# Patient Record
Sex: FEMALE
[De-identification: names, ages, dates, MRNs, and addresses within clinical notes are randomized per-mention and may not be internally consistent; named-entity substitution may affect disease eponyms.]

---

## 2018-09-25 PROBLEM — Z00.00 ENCOUNTER FOR PREVENTIVE HEALTH EXAMINATION: Status: ACTIVE | Noted: 2018-09-25

## 2018-11-20 ENCOUNTER — RECORD ABSTRACTING (OUTPATIENT)
Age: 83
End: 2018-11-20

## 2018-11-20 DIAGNOSIS — D35.00 BENIGN NEOPLASM OF UNSPECIFIED ADRENAL GLAND: ICD-10-CM

## 2018-11-20 DIAGNOSIS — Z86.39 PERSONAL HISTORY OF OTHER ENDOCRINE, NUTRITIONAL AND METABOLIC DISEASE: ICD-10-CM

## 2018-11-20 DIAGNOSIS — Z82.49 FAMILY HISTORY OF ISCHEMIC HEART DISEASE AND OTHER DISEASES OF THE CIRCULATORY SYSTEM: ICD-10-CM

## 2018-11-20 DIAGNOSIS — Z87.39 PERSONAL HISTORY OF OTHER DISEASES OF THE MUSCULOSKELETAL SYSTEM AND CONNECTIVE TISSUE: ICD-10-CM

## 2018-11-20 DIAGNOSIS — Z86.79 PERSONAL HISTORY OF OTHER DISEASES OF THE CIRCULATORY SYSTEM: ICD-10-CM

## 2018-11-20 RX ORDER — METOPROLOL SUCCINATE 50 MG/1
50 TABLET, EXTENDED RELEASE ORAL
Refills: 0 | Status: ACTIVE | COMMUNITY

## 2018-11-20 RX ORDER — PANTOPRAZOLE 40 MG/1
40 TABLET, DELAYED RELEASE ORAL DAILY
Refills: 0 | Status: ACTIVE | COMMUNITY

## 2018-11-21 ENCOUNTER — APPOINTMENT (OUTPATIENT)
Dept: ENDOCRINOLOGY | Facility: CLINIC | Age: 83
End: 2018-11-21

## 2019-01-15 ENCOUNTER — APPOINTMENT (OUTPATIENT)
Dept: ENDOCRINOLOGY | Facility: CLINIC | Age: 84
End: 2019-01-15
Payer: MEDICARE

## 2019-01-15 VITALS
WEIGHT: 157 LBS | HEART RATE: 71 BPM | OXYGEN SATURATION: 98 % | SYSTOLIC BLOOD PRESSURE: 130 MMHG | HEIGHT: 66 IN | BODY MASS INDEX: 25.23 KG/M2 | DIASTOLIC BLOOD PRESSURE: 70 MMHG

## 2019-01-15 LAB — GLUCOSE BLDC GLUCOMTR-MCNC: 325

## 2019-01-15 PROCEDURE — 82962 GLUCOSE BLOOD TEST: CPT

## 2019-01-15 PROCEDURE — 99214 OFFICE O/P EST MOD 30 MIN: CPT | Mod: 25

## 2019-01-15 RX ORDER — NIFEDIPINE 90 MG/1
90 TABLET, FILM COATED, EXTENDED RELEASE ORAL DAILY
Refills: 0 | Status: ACTIVE | COMMUNITY

## 2019-01-15 RX ORDER — CLOPIDOGREL 75 MG/1
75 TABLET, FILM COATED ORAL DAILY
Refills: 0 | Status: ACTIVE | COMMUNITY

## 2019-01-15 RX ORDER — ATORVASTATIN CALCIUM 20 MG/1
20 TABLET, FILM COATED ORAL DAILY
Refills: 0 | Status: ACTIVE | COMMUNITY

## 2019-01-15 RX ORDER — MONTELUKAST SODIUM 10 MG/1
10 TABLET, FILM COATED ORAL DAILY
Refills: 0 | Status: ACTIVE | COMMUNITY

## 2019-01-15 RX ORDER — METHYLPREDNISOLONE 4 MG/1
4 TABLET ORAL
Refills: 0 | Status: DISCONTINUED | COMMUNITY
End: 2019-01-15

## 2019-01-15 RX ORDER — VALSARTAN 320 MG/1
320 TABLET, COATED ORAL DAILY
Refills: 0 | Status: ACTIVE | COMMUNITY

## 2019-01-15 NOTE — ASSESSMENT
[FreeTextEntry1] : 84 year old female with T2DM, hyperlipidemia, hypertension, and vitamin D deficiency. Glycemic control is suboptimal, although she is not a candidate for tight control given age, comorbidities, and risk of hypoglycemia.\par \par 1. T2DM- A1c 8.7% with post prandial hyperglycemia.\par -Increase Novolog to 10 units at breakfast, 9 units at lunch, and 9 units at dinner.\par -Continue Lantus 16 units daily at bedtime.\par -Continue Glimepiride.\par -Repeat A1c in 3 months.\par \par 2. Hypertension- controlled\par -Continue current regimen.\par \par 3. Hyperlipidemia- improving.\par -Continue statin.\par \par 4. Vitamin D deficiency\par -Start vitamin D 1000 IU daily.

## 2019-01-15 NOTE — HISTORY OF PRESENT ILLNESS
[FreeTextEntry1] : Lung nodule on recent CT scan, getting PET scan.\par \par Type: 2\par Severity: moderate\par Associated symptoms: neuropathy, CKD\par Modifying factors: frequent steroid use for COPD exacerbation\par \par Current meds for glycemic control:\par Glimepiride 4 mg BID\par Lantus 16 units at bedtime\par Novolog 9--8--8\par SMBG 4x daily, AC and HS. Reviewed meter. BG is high 100s fasting in AM and 200s to 300s throughout the day.\par Current B, ate a turkey sandwich one hour ago\par \par Last eye exam: 2019, +DR, c/o blurry vision in left eye\par Last foot exam: 2018, numbness tingling in left foot. Saw vascular earlier this month.\par Diet: low potassium diet per renal, good appetite\par Weight: loss\par Exercise: none, feels weak in her legs

## 2019-01-15 NOTE — REVIEW OF SYSTEMS
[Recent Weight Loss (___ Lbs)] : recent [unfilled] ~Ulb weight loss [Blurry Vision] : blurred vision [Polyuria] : polyuria [Pain/Numbness of Digits] : pain/numbness of digits [Chest Pain] : no chest pain [Palpitations] : no palpitations [Shortness Of Breath] : no shortness of breath [Nausea] : no nausea [Vomiting] : no vomiting was observed [Abdominal Pain] : no abdominal pain [Polydipsia] : no polydipsia

## 2019-01-15 NOTE — PHYSICAL EXAM
[Alert] : alert [No Acute Distress] : no acute distress [Well Nourished] : well nourished [Well Developed] : well developed [Normal Sclera/Conjunctiva] : normal sclera/conjunctiva [EOMI] : extra ocular movement intact [No Proptosis] : no proptosis [Thyroid Not Enlarged] : the thyroid was not enlarged [No Thyroid Nodules] : there were no palpable thyroid nodules [No Respiratory Distress] : no respiratory distress [No Accessory Muscle Use] : no accessory muscle use [Clear to Auscultation] : lungs were clear to auscultation bilaterally [Normal Rate] : heart rate was normal  [Normal S1, S2] : normal S1 and S2 [Regular Rhythm] : with a regular rhythm [No Edema] : there was no peripheral edema [Anterior Cervical Nodes] : anterior cervical nodes [Normal] : normal and non tender [No Stigmata of Cushings Syndrome] : no stigmata of cushings syndrome [Normal Reflexes] : deep tendon reflexes were 2+ and symmetric [No Tremors] : no tremors [Oriented x3] : oriented to person, place, and time [Acanthosis Nigricans] : no acanthosis nigricans [de-identified] : uses a walker [de-identified] : patchy, red, raised, rash to B/L LE

## 2019-01-28 ENCOUNTER — APPOINTMENT (OUTPATIENT)
Dept: ENDOCRINOLOGY | Facility: CLINIC | Age: 84
End: 2019-01-28

## 2019-05-28 ENCOUNTER — APPOINTMENT (OUTPATIENT)
Dept: ENDOCRINOLOGY | Facility: CLINIC | Age: 84
End: 2019-05-28

## 2019-10-24 ENCOUNTER — APPOINTMENT (OUTPATIENT)
Dept: ENDOCRINOLOGY | Facility: CLINIC | Age: 84
End: 2019-10-24
Payer: MEDICARE

## 2019-10-24 VITALS
HEART RATE: 66 BPM | WEIGHT: 147 LBS | OXYGEN SATURATION: 97 % | DIASTOLIC BLOOD PRESSURE: 70 MMHG | HEIGHT: 66 IN | BODY MASS INDEX: 23.63 KG/M2 | SYSTOLIC BLOOD PRESSURE: 146 MMHG

## 2019-10-24 LAB — GLUCOSE BLDC GLUCOMTR-MCNC: 211

## 2019-10-24 PROCEDURE — 82962 GLUCOSE BLOOD TEST: CPT

## 2019-10-24 PROCEDURE — 99214 OFFICE O/P EST MOD 30 MIN: CPT | Mod: 25

## 2019-10-24 NOTE — REASON FOR VISIT
[Follow-Up: _____] : a [unfilled] follow-up visit [Other: _____] : [unfilled] [FreeTextEntry1] : Type 2 DM, HTN, HLD, and Vitamin D Deficiency

## 2019-10-24 NOTE — HISTORY OF PRESENT ILLNESS
[FreeTextEntry1] : Quality: Type 2 DM \par Severity: moderate \par Duration: over 12 years \par Onset: routine labs\par Modifying Factors: Better with medication \par Associated Symptoms: neuropathy. CKD\par \par Current Regimen:\par Glimepiride 4 mg BID\par Novolog \par Lantus 16 units at HS\par \par \par Self blood sugar monitorin-5 times a day, per meter - variable, depending on diet \par 260, 145, 176, 124, 270, 500, 183, 251, 129, 206, 216, 119 - dates and times not correct in meter \par  today\par \par exercise: physical therapy \par - lower back surgery in 2019, then rehab for 3 months \par \par Diet: high carbs\par \par \par Date of last eye exam: 8 months ago (-) diabetic retinopathy \par Date of last foot exam: 2019 with podiatry \par Date of last flu vaccine: \par Date of last Pneumovax: unsure

## 2019-10-24 NOTE — REVIEW OF SYSTEMS
[Decreased Appetite] : ~L decreased appetite [Recent Weight Loss (___ Lbs)] : recent [unfilled] ~Ulb weight loss [Polyuria] : polyuria [Depression] : depression [Anxiety] : anxiety [Swelling] : swelling [Easy Bruising] : a tendency for easy bruising [Fatigue] : no fatigue [Visual Field Defect] : no visual field defect [Blurry Vision] : no blurred vision [Dysphagia] : no dysphagia [Dysphonia] : no dysphonia [Neck Pain] : no neck pain [Chest Pain] : no chest pain [Palpitations] : no palpitations [Constipation] : no constipation [Diarrhea] : no diarrhea [Dysuria] : no dysuria [Headache] : no headaches [Tremors] : no tremors [Polydipsia] : no polydipsia [Cold Intolerance] : cold tolerant [Heat Intolerance] : heat tolerant [de-identified] : never started medication  [de-identified] : right elbow

## 2019-10-24 NOTE — PHYSICAL EXAM
[Alert] : alert [No Acute Distress] : no acute distress [Well Nourished] : well nourished [Well Developed] : well developed [Normal Sclera/Conjunctiva] : normal sclera/conjunctiva [EOMI] : extra ocular movement intact [Supple] : the neck was supple [No LAD] : no lymphadenopathy [Thyroid Not Enlarged] : the thyroid was not enlarged [No Thyroid Nodules] : there were no palpable thyroid nodules [Normal Rate and Effort] : normal respiratory rhythm and effort [No Accessory Muscle Use] : no accessory muscle use [Clear to Auscultation] : lungs were clear to auscultation bilaterally [Normal Rate] : heart rate was normal  [Normal S1, S2] : normal S1 and S2 [Regular Rhythm] : with a regular rhythm [No Edema] : there was no peripheral edema [Normal Bowel Sounds] : normal bowel sounds [Not Tender] : non-tender [Soft] : abdomen soft [No Rash] : no rash [Acanthosis Nigricans] : no acanthosis nigricans [No Tremors] : no tremors [Normal Insight/Judgement] : insight and judgment were intact [Oriented x3] : oriented to person, place, and time [Normal Mood] : the mood was normal [de-identified] : ambulates with rolling walker  [de-identified] : Pt. declined foot examination

## 2019-10-24 NOTE — ASSESSMENT
[FreeTextEntry1] : 83 y/o female with Type 2 DM, HTN, HLD, and Vitamin D Deficiency. Labs reviewed from , cholesterol 184, LDL 88\par \par Plan: \par Type 2 DM: check A1C now - not a candiate for tight glycemic control\par - continue Glimepiride 4 mg BID, Novolog 8-8-9 and Lantus 16 units at HS\par - continue self blood sugar monitoring\par - send in logs in 1 week\par - continue physical therapy\par - follow a low carb diet\par \par HTN: acceptable, continue current medication regimen\par \par HLD: controlled with statin \par - check lipids in 3 months\par \par Vitamin D Deficiency: monitor labs, continue vitamin D supplement \par \par Follow up visit in 2-3 months.

## 2019-10-25 ENCOUNTER — RESULT REVIEW (OUTPATIENT)
Age: 84
End: 2019-10-25

## 2019-10-25 LAB
ESTIMATED AVERAGE GLUCOSE: 192 MG/DL
HBA1C MFR BLD HPLC: 8.3 %
TSH SERPL-ACNC: 2.68 UIU/ML

## 2020-01-13 LAB — LDLC SERPL DIRECT ASSAY-MCNC: 88.9

## 2020-01-14 ENCOUNTER — APPOINTMENT (OUTPATIENT)
Dept: ENDOCRINOLOGY | Facility: CLINIC | Age: 85
End: 2020-01-14
Payer: MEDICARE

## 2020-01-14 VITALS
DIASTOLIC BLOOD PRESSURE: 70 MMHG | SYSTOLIC BLOOD PRESSURE: 158 MMHG | HEART RATE: 58 BPM | BODY MASS INDEX: 23.3 KG/M2 | HEIGHT: 66 IN | WEIGHT: 145 LBS | OXYGEN SATURATION: 96 %

## 2020-01-14 LAB — GLUCOSE BLDC GLUCOMTR-MCNC: 154

## 2020-01-14 PROCEDURE — 82962 GLUCOSE BLOOD TEST: CPT

## 2020-01-14 PROCEDURE — 99214 OFFICE O/P EST MOD 30 MIN: CPT | Mod: 25

## 2020-01-14 NOTE — PHYSICAL EXAM
[Alert] : alert [No Acute Distress] : no acute distress [Well Nourished] : well nourished [Well Developed] : well developed [Normal Sclera/Conjunctiva] : normal sclera/conjunctiva [EOMI] : extra ocular movement intact [Supple] : the neck was supple [No LAD] : no lymphadenopathy [Thyroid Not Enlarged] : the thyroid was not enlarged [Normal Rate and Effort] : normal respiratory rhythm and effort [No Thyroid Nodules] : there were no palpable thyroid nodules [No Accessory Muscle Use] : no accessory muscle use [Clear to Auscultation] : lungs were clear to auscultation bilaterally [Normal Rate] : heart rate was normal  [Regular Rhythm] : with a regular rhythm [Normal S1, S2] : normal S1 and S2 [Not Tender] : non-tender [No Edema] : there was no peripheral edema [Normal Bowel Sounds] : normal bowel sounds [Soft] : abdomen soft [Right Foot Was Examined] : right foot ~C was examined [Left Foot Was Examined] : left foot ~C was examined [Normal] : normal [No Tremors] : no tremors [Normal Insight/Judgement] : insight and judgment were intact [Oriented x3] : oriented to person, place, and time [Normal Mood] : the mood was normal [Foot Ulcers] : no foot ulcers [#1 Diminished] : number 1 was normal [Acanthosis Nigricans] : no acanthosis nigricans [#2 Diminished] : number 2 was normal [#3 Diminished] : number 3 was normal [#5 Diminished] : number 5 was normal [#4 Diminished] : number 4 was normal [#6 Diminished] : number 6 was normal [#7 Diminished] : number 7 was normal [#8 Diminished] : number 8 was normal [#9 Diminished] : number 9 was normal [de-identified] : ambulates with rolling walker  [de-identified] : left shin with red rash in 2 areas

## 2020-01-14 NOTE — HISTORY OF PRESENT ILLNESS
[FreeTextEntry1] : Quality: Type 2 DM \par Severity: moderate \par Duration: over 12 years \par Onset: routine labs\par Modifying Factors: Better with medication \par Associated Symptoms: neuropathy. CKD\par \par Current Regimen:\par Glimepiride 4 mg BID\par Novolog \par Lantus 20 units at HS\par \par \par Self blood sugar monitorin-5 times a day, per meter - variable, depending on diet \par 248, 110, 383, 232, 164, 281, 133, 101, 183, 249, 272, 246, 120 - dates and times not correct in meter \par  today\par \par exercise: walking \par - lower back surgery in 2019, then rehab for 3 months \par \par Diet: high carb diet \par \par \par Date of last eye exam: 2019 (-) diabetic retinopathy \par Date of last foot exam: 2019 with podiatry \par Date of last flu vaccine: \par Date of last Pneumovax: unsure

## 2020-01-14 NOTE — REVIEW OF SYSTEMS
[Headache] : headaches [Anxiety] : anxiety [Fatigue] : no fatigue [Recent Weight Gain (___ Lbs)] : no recent weight gain [Decreased Appetite] : appetite not decreased [Visual Field Defect] : no visual field defect [Recent Weight Loss (___ Lbs)] : no recent weight loss [Blurry Vision] : no blurred vision [Dysphagia] : no dysphagia [Neck Pain] : no neck pain [Dysphonia] : no dysphonia [Chest Pain] : no chest pain [Palpitations] : no palpitations [Constipation] : no constipation [Diarrhea] : no diarrhea [Tremors] : no tremors [Dysuria] : no dysuria [Polyuria] : no polyuria [Depression] : no depression [Polydipsia] : no polydipsia [Heat Intolerance] : heat tolerant [Cold Intolerance] : cold tolerant [Easy Bruising] : no tendency for easy bruising [Swelling] : no swelling [FreeTextEntry2] : weight stable  [de-identified] : r/t blood pressure

## 2020-01-14 NOTE — ASSESSMENT
[FreeTextEntry1] : 84 y/o female with Type 2 DM, HTN, HLD, and Vitamin D Deficiency. No recent labs. \par \par Plan: \par Type 2 DM: check A1C now - not a candidate for tight glycemic control\par - continue Glimepiride 4 mg BID, Novolog 8-9-9 and Lantus 20 units at HS\par - continue self blood sugar monitoring\par - send in logs in 1 week\par - follow a low carb diet\par \par HTN: acceptable, continue current medication regimen\par \par HLD: continue statin \par - check lipids now\par \par Vitamin D Deficiency: monitor labs, continue vitamin D supplement \par \par Follow up with dermatology r/t left shin rash \par \par Labs and follow up visit in 3 months with Dr. Christine

## 2020-01-21 ENCOUNTER — RESULT REVIEW (OUTPATIENT)
Age: 85
End: 2020-01-21

## 2020-02-24 RX ORDER — LANCETS 33 GAUGE
EACH MISCELLANEOUS
Qty: 3 | Refills: 1 | Status: ACTIVE | COMMUNITY
Start: 2020-02-24 | End: 1900-01-01

## 2020-02-28 LAB
HBA1C MFR BLD HPLC: 8.5
LDLC SERPL DIRECT ASSAY-MCNC: 122

## 2020-03-02 ENCOUNTER — APPOINTMENT (OUTPATIENT)
Dept: ENDOCRINOLOGY | Facility: CLINIC | Age: 85
End: 2020-03-02
Payer: MEDICARE

## 2020-03-02 ENCOUNTER — RX RENEWAL (OUTPATIENT)
Age: 85
End: 2020-03-02

## 2020-03-02 VITALS
HEIGHT: 66 IN | OXYGEN SATURATION: 94 % | DIASTOLIC BLOOD PRESSURE: 80 MMHG | BODY MASS INDEX: 23.3 KG/M2 | HEART RATE: 63 BPM | SYSTOLIC BLOOD PRESSURE: 140 MMHG | WEIGHT: 145 LBS

## 2020-03-02 DIAGNOSIS — F17.200 NICOTINE DEPENDENCE, UNSPECIFIED, UNCOMPLICATED: ICD-10-CM

## 2020-03-02 LAB — GLUCOSE BLDC GLUCOMTR-MCNC: 112

## 2020-03-02 PROCEDURE — 99214 OFFICE O/P EST MOD 30 MIN: CPT | Mod: 25

## 2020-03-02 PROCEDURE — 82962 GLUCOSE BLOOD TEST: CPT

## 2020-03-02 RX ORDER — BLOOD SUGAR DIAGNOSTIC
STRIP MISCELLANEOUS
Qty: 300 | Refills: 1 | Status: ACTIVE | COMMUNITY
Start: 2020-02-24 | End: 1900-01-01

## 2020-03-02 NOTE — HISTORY OF PRESENT ILLNESS
[FreeTextEntry1] : Pt. going to have CT guided lung biopsy due to right upper lung lesion. Currently she is off of Plavix and Aspirin for up coming procedure. No scheduled date for lung biopsy. \par \par Quality: Type 2 DM \par Severity: moderate \par Duration: over 12 years \par Onset: routine labs\par Modifying Factors: Better with medication \par Associated Symptoms: neuropathy. CKD\par \par Current Regimen:\par Glimepiride 4 mg BID\par Novolog 88-9 - adjusted due to afraid of going low \par Lantus 16 units at HS - pt. adjusted own dose \par \par \par Self blood sugar monitorin-5 times a day, per meter - variable, depending on diet \par 243, 125, 240, 300, 278, 166, 115, 195, 336, 264 - dates and times not correct in meter \par Current \par \par exercise: walking \par - lower back surgery in 2019, then rehab for 3 months \par \par Diet: high carb diet \par \par Date of last eye exam: 2019 (-) diabetic retinopathy \par Date of last foot exam: 1 month ago with podiatry \par Date of last flu vaccine: \par Date of last Pneumovax: unsure

## 2020-03-02 NOTE — PHYSICAL EXAM
[Alert] : alert [No Acute Distress] : no acute distress [Well Nourished] : well nourished [Well Developed] : well developed [Normal Sclera/Conjunctiva] : normal sclera/conjunctiva [EOMI] : extra ocular movement intact [Supple] : the neck was supple [No LAD] : no lymphadenopathy [Thyroid Not Enlarged] : the thyroid was not enlarged [No Thyroid Nodules] : there were no palpable thyroid nodules [Normal Rate and Effort] : normal respiratory rhythm and effort [No Accessory Muscle Use] : no accessory muscle use [Clear to Auscultation] : lungs were clear to auscultation bilaterally [Normal Rate] : heart rate was normal  [Normal S1, S2] : normal S1 and S2 [Regular Rhythm] : with a regular rhythm [No Edema] : there was no peripheral edema [Normal Bowel Sounds] : normal bowel sounds [Soft] : abdomen soft [Not Tender] : non-tender [Right Foot Was Examined] : right foot ~C was examined [Left Foot Was Examined] : left foot ~C was examined [Normal] : normal [No Tremors] : no tremors [Oriented x3] : oriented to person, place, and time [Normal Mood] : the mood was normal [Normal Insight/Judgement] : insight and judgment were intact [Foot Ulcers] : no foot ulcers [Acanthosis Nigricans] : no acanthosis nigricans [#1 Diminished] : number 1 was normal [#3 Diminished] : number 3 was normal [#2 Diminished] : number 2 was normal [#5 Diminished] : number 5 was normal [#6 Diminished] : number 6 was normal [#4 Diminished] : number 4 was normal [#8 Diminished] : number 8 was normal [#7 Diminished] : number 7 was normal [#9 Diminished] : number 9 was normal [de-identified] : left shin with red rash in 2 areas  [de-identified] : ambulates with rolling walker

## 2020-03-02 NOTE — ASSESSMENT
[FreeTextEntry1] : 84 y/o female with Type 2 DM, HTN, HLD, and Vitamin D Deficiency. Labs reviewed from 1/17/20 - , A1C 8.5%, vitamin D 27, cholesterol 216, and \par \par Plan: \par Type 2 DM: check A1C now - not a candidate for tight glycemic control\par - continue Glimepiride 4 mg BID, Novolog 8-8-9 and Lantus 16 units at HS\par - continue self blood sugar monitoring\par - send in logs in 1 week\par - ordered alina personal sensor \par - follow a low carb diet\par - schedule appointment in 2.5 weeks with CDE to review diet and alina download\par \par HTN: BP acceptable, continue current medication regimen\par \par HLD:  check lipids now, continue statin \par \par Vitamin D Deficiency: monitor labs, continue vitamin D supplement \par \par Follow up with dermatology r/t left shin rash \par \par Labs now and keep follow up appointment in April with Dr. Christine

## 2020-03-02 NOTE — REVIEW OF SYSTEMS
[Fatigue] : fatigue [Headache] : headaches [Anxiety] : anxiety [Decreased Appetite] : appetite not decreased [Recent Weight Gain (___ Lbs)] : no recent weight gain [Recent Weight Loss (___ Lbs)] : no recent weight loss [Visual Field Defect] : no visual field defect [Blurry Vision] : no blurred vision [Dysphonia] : no dysphonia [Dysphagia] : no dysphagia [Neck Pain] : no neck pain [Palpitations] : no palpitations [Chest Pain] : no chest pain [Constipation] : no constipation [Diarrhea] : no diarrhea [Polyuria] : no polyuria [Dysuria] : no dysuria [Tremors] : no tremors [Depression] : no depression [Polydipsia] : no polydipsia [Cold Intolerance] : cold tolerant [Easy Bruising] : no tendency for easy bruising [Swelling] : no swelling [Heat Intolerance] : heat tolerant [FreeTextEntry2] : weight stable [de-identified] : Hx of headaches

## 2020-03-03 LAB
25(OH)D3 SERPL-MCNC: 35.7 NG/ML
ALBUMIN SERPL ELPH-MCNC: 4.5 G/DL
ALP BLD-CCNC: 96 U/L
ALT SERPL-CCNC: 15 U/L
ANION GAP SERPL CALC-SCNC: 13 MMOL/L
AST SERPL-CCNC: 17 U/L
BASOPHILS # BLD AUTO: 0.05 K/UL
BASOPHILS NFR BLD AUTO: 0.7 %
BILIRUB SERPL-MCNC: 0.2 MG/DL
BUN SERPL-MCNC: 30 MG/DL
CALCIUM SERPL-MCNC: 9.6 MG/DL
CHLORIDE SERPL-SCNC: 105 MMOL/L
CHOLEST SERPL-MCNC: 187 MG/DL
CHOLEST/HDLC SERPL: 3 RATIO
CO2 SERPL-SCNC: 26 MMOL/L
CREAT SERPL-MCNC: 1.13 MG/DL
CREAT SPEC-SCNC: 21 MG/DL
EOSINOPHIL # BLD AUTO: 0.06 K/UL
EOSINOPHIL NFR BLD AUTO: 0.8 %
ESTIMATED AVERAGE GLUCOSE: 192 MG/DL
GLUCOSE SERPL-MCNC: 112 MG/DL
HBA1C MFR BLD HPLC: 8.3 %
HCT VFR BLD CALC: 40.1 %
HDLC SERPL-MCNC: 63 MG/DL
HGB BLD-MCNC: 12.3 G/DL
IMM GRANULOCYTES NFR BLD AUTO: 0.4 %
LDLC SERPL CALC-MCNC: 102 MG/DL
LYMPHOCYTES # BLD AUTO: 1.51 K/UL
LYMPHOCYTES NFR BLD AUTO: 20.4 %
MAGNESIUM SERPL-MCNC: 2.4 MG/DL
MAN DIFF?: NORMAL
MCHC RBC-ENTMCNC: 30.4 PG
MCHC RBC-ENTMCNC: 30.7 GM/DL
MCV RBC AUTO: 99 FL
MICROALBUMIN 24H UR DL<=1MG/L-MCNC: 26.2 MG/DL
MICROALBUMIN/CREAT 24H UR-RTO: 1223 MG/G
MONOCYTES # BLD AUTO: 0.73 K/UL
MONOCYTES NFR BLD AUTO: 9.9 %
NEUTROPHILS # BLD AUTO: 5.03 K/UL
NEUTROPHILS NFR BLD AUTO: 67.8 %
PLATELET # BLD AUTO: 235 K/UL
POTASSIUM SERPL-SCNC: 5.3 MMOL/L
PROT SERPL-MCNC: 7.1 G/DL
RBC # BLD: 4.05 M/UL
RBC # FLD: 12.9 %
SODIUM SERPL-SCNC: 144 MMOL/L
TRIGL SERPL-MCNC: 111 MG/DL
TSH SERPL-ACNC: 3.31 UIU/ML
VIT B12 SERPL-MCNC: 762 PG/ML
WBC # FLD AUTO: 7.41 K/UL

## 2020-03-06 ENCOUNTER — APPOINTMENT (OUTPATIENT)
Dept: ENDOCRINOLOGY | Facility: CLINIC | Age: 85
End: 2020-03-06
Payer: MEDICARE

## 2020-03-06 PROCEDURE — G0108 DIAB MANAGE TRN  PER INDIV: CPT

## 2020-04-09 ENCOUNTER — RX RENEWAL (OUTPATIENT)
Age: 85
End: 2020-04-09

## 2020-04-10 ENCOUNTER — APPOINTMENT (OUTPATIENT)
Dept: ENDOCRINOLOGY | Facility: CLINIC | Age: 85
End: 2020-04-10

## 2020-04-17 ENCOUNTER — APPOINTMENT (OUTPATIENT)
Dept: ENDOCRINOLOGY | Facility: CLINIC | Age: 85
End: 2020-04-17
Payer: MEDICARE

## 2020-04-17 PROCEDURE — 99441: CPT

## 2020-06-16 ENCOUNTER — RX RENEWAL (OUTPATIENT)
Age: 85
End: 2020-06-16

## 2020-06-22 ENCOUNTER — LABORATORY RESULT (OUTPATIENT)
Age: 85
End: 2020-06-22

## 2020-06-22 ENCOUNTER — APPOINTMENT (OUTPATIENT)
Dept: ENDOCRINOLOGY | Facility: CLINIC | Age: 85
End: 2020-06-22
Payer: MEDICARE

## 2020-06-22 DIAGNOSIS — E11.65 TYPE 2 DIABETES MELLITUS WITH HYPERGLYCEMIA: ICD-10-CM

## 2020-06-22 PROCEDURE — 36415 COLL VENOUS BLD VENIPUNCTURE: CPT

## 2020-06-23 LAB
25(OH)D3 SERPL-MCNC: 32.1 NG/ML
ALBUMIN SERPL ELPH-MCNC: 4.3 G/DL
ALP BLD-CCNC: 106 U/L
ALT SERPL-CCNC: 11 U/L
ANION GAP SERPL CALC-SCNC: 12 MMOL/L
AST SERPL-CCNC: 16 U/L
BASOPHILS # BLD AUTO: 0.04 K/UL
BASOPHILS NFR BLD AUTO: 0.7 %
BILIRUB SERPL-MCNC: 0.2 MG/DL
BUN SERPL-MCNC: 28 MG/DL
CALCIUM SERPL-MCNC: 9.8 MG/DL
CHLORIDE SERPL-SCNC: 106 MMOL/L
CHOLEST SERPL-MCNC: 189 MG/DL
CHOLEST/HDLC SERPL: 3.2 RATIO
CO2 SERPL-SCNC: 24 MMOL/L
CREAT SERPL-MCNC: 1.18 MG/DL
CREAT SPEC-SCNC: 100 MG/DL
EOSINOPHIL # BLD AUTO: 0.08 K/UL
EOSINOPHIL NFR BLD AUTO: 1.4 %
ESTIMATED AVERAGE GLUCOSE: 177 MG/DL
GLUCOSE SERPL-MCNC: 171 MG/DL
HBA1C MFR BLD HPLC: 7.8 %
HCT VFR BLD CALC: 41 %
HDLC SERPL-MCNC: 58 MG/DL
HGB BLD-MCNC: 11.4 G/DL
IMM GRANULOCYTES NFR BLD AUTO: 0.2 %
LDLC SERPL CALC-MCNC: 101 MG/DL
LYMPHOCYTES # BLD AUTO: 0.75 K/UL
LYMPHOCYTES NFR BLD AUTO: 13.6 %
MAGNESIUM SERPL-MCNC: 2.4 MG/DL
MAN DIFF?: NORMAL
MCHC RBC-ENTMCNC: 27.8 GM/DL
MCHC RBC-ENTMCNC: 29 PG
MCV RBC AUTO: 104.3 FL
MICROALBUMIN 24H UR DL<=1MG/L-MCNC: 79 MG/DL
MICROALBUMIN/CREAT 24H UR-RTO: 792 MG/G
MONOCYTES # BLD AUTO: 0.76 K/UL
MONOCYTES NFR BLD AUTO: 13.7 %
NEUTROPHILS # BLD AUTO: 3.89 K/UL
NEUTROPHILS NFR BLD AUTO: 70.4 %
PLATELET # BLD AUTO: 208 K/UL
POTASSIUM SERPL-SCNC: 4.8 MMOL/L
PROT SERPL-MCNC: 6.9 G/DL
RBC # BLD: 3.93 M/UL
RBC # FLD: 14.8 %
SODIUM SERPL-SCNC: 141 MMOL/L
T3FREE SERPL-MCNC: 2.78 PG/ML
T4 FREE SERPL-MCNC: 1.1 NG/DL
TRIGL SERPL-MCNC: 146 MG/DL
TSH SERPL-ACNC: 2.31 UIU/ML
VIT B12 SERPL-MCNC: 764 PG/ML
WBC # FLD AUTO: 5.53 K/UL

## 2020-06-29 ENCOUNTER — RX RENEWAL (OUTPATIENT)
Age: 85
End: 2020-06-29

## 2020-06-30 ENCOUNTER — APPOINTMENT (OUTPATIENT)
Dept: ENDOCRINOLOGY | Facility: CLINIC | Age: 85
End: 2020-06-30
Payer: MEDICARE

## 2020-06-30 PROCEDURE — 99441: CPT | Mod: 95

## 2020-07-05 NOTE — HISTORY OF PRESENT ILLNESS
[FreeTextEntry1] : PHONE JITENDRA\par Start TIME 247 PM \par END time 257 pM \par Pt dx with lung CA   getting TX with XRT \par Today pt not doin  well with breathing  and B P is high 175/74 \par   BS habe been ok \par \par \par Quality: Type 2 DM \par Severity: moderate \par Duration: over 12 years \par Onset: routine labs\par Modifying Factors: Better with medication \par Associated Symptoms: neuropathy. CKD\par \par Current Regimen:\par Glimepiride 4 mg BID\par Novolog 8-8-(8-10) - adjusted due to afraid of going low \par Lantus 16 -18 units at  - pt. adjusted own dose \par \par \par Self blood sugar monitorin-5 times a day\par \par exercise: walking \par - lower back surgery in 2019, then rehab for 3 months \par \par Diet: high carb diet \par \par Date of last eye exam: 2019 (-) diabetic retinopathy \par Date of last foot exam: 1 month ago with podiatry \par Date of last flu vaccine: \par Date of last Pneumovax: unsure \par \par DX with COVID 2 months ago  went ot hospital

## 2020-07-05 NOTE — REASON FOR VISIT
[Follow-Up: _____] : a [unfilled] follow-up visit [Other: _____] : [unfilled] [Other:_______] : [unfilled] [FreeTextEntry1] :  Type 2 DM, HTN, HLD, and Vitamin D Deficiency

## 2020-07-05 NOTE — ASSESSMENT
[FreeTextEntry1] : 86 y/o female with Type 2 DM, HTN, HLD, and Vitamin D Deficiency.  Currently pt feelign dyspneic aand having  eleavtion in Her BP- - \par pt will call 911 to go to ER_ she was waitign for her daugther to get home \par \par Plan: \par Type 2 DM: seems controled not a candidate for tight glycemic control\par - continue Glimepiride 4 mg BID, Novolog 8-8-(8-10)  and Lantus 16-18units at HS\par - continue self blood sugar monitoring\par - send in logs in 1 week\par \par \par HTN: to go to ER \par \par HLD:  check lipids now, continue statin \par \par Vitamin D Deficiency: monitor labs, continue vitamin D supplement \par

## 2020-08-10 ENCOUNTER — APPOINTMENT (OUTPATIENT)
Dept: ENDOCRINOLOGY | Facility: CLINIC | Age: 85
End: 2020-08-10
Payer: MEDICARE

## 2020-08-10 VITALS
HEART RATE: 64 BPM | HEIGHT: 66 IN | BODY MASS INDEX: 24.91 KG/M2 | DIASTOLIC BLOOD PRESSURE: 70 MMHG | SYSTOLIC BLOOD PRESSURE: 134 MMHG | WEIGHT: 155 LBS

## 2020-08-10 DIAGNOSIS — E53.8 DEFICIENCY OF OTHER SPECIFIED B GROUP VITAMINS: ICD-10-CM

## 2020-08-10 DIAGNOSIS — M81.0 AGE-RELATED OSTEOPOROSIS W/OUT CURRENT PATHOLOGICAL FRACTURE: ICD-10-CM

## 2020-08-10 LAB — GLUCOSE BLDC GLUCOMTR-MCNC: 257

## 2020-08-10 PROCEDURE — 99214 OFFICE O/P EST MOD 30 MIN: CPT | Mod: 25

## 2020-08-10 PROCEDURE — 82962 GLUCOSE BLOOD TEST: CPT

## 2020-08-10 RX ORDER — FLASH GLUCOSE SENSOR
KIT MISCELLANEOUS
Qty: 6 | Refills: 1 | Status: DISCONTINUED | COMMUNITY
Start: 2020-03-02 | End: 2020-08-10

## 2020-08-10 RX ORDER — FUROSEMIDE 20 MG/1
20 TABLET ORAL
Refills: 0 | Status: DISCONTINUED | COMMUNITY
End: 2020-08-10

## 2020-08-10 RX ORDER — GLIMEPIRIDE 4 MG/1
4 TABLET ORAL
Qty: 90 | Refills: 0 | Status: ACTIVE | COMMUNITY
Start: 2020-03-02

## 2020-08-10 RX ORDER — FLASH GLUCOSE SCANNING READER
EACH MISCELLANEOUS
Qty: 1 | Refills: 3 | Status: DISCONTINUED | COMMUNITY
Start: 2020-03-02 | End: 2020-08-10

## 2020-08-10 RX ORDER — HYDRALAZINE HYDROCHLORIDE 50 MG/1
50 TABLET ORAL DAILY
Refills: 0 | Status: ACTIVE | COMMUNITY

## 2020-08-10 RX ORDER — INSULIN GLARGINE 100 [IU]/ML
100 INJECTION, SOLUTION SUBCUTANEOUS AT BEDTIME
Qty: 1 | Refills: 1 | Status: ACTIVE | COMMUNITY
Start: 1900-01-01 | End: 1900-01-01

## 2020-08-10 RX ORDER — GABAPENTIN 300 MG/1
300 CAPSULE ORAL TWICE DAILY
Refills: 0 | Status: ACTIVE | COMMUNITY

## 2020-08-10 RX ORDER — PREDNISONE 50 MG/1
TABLET ORAL
Refills: 0 | Status: ACTIVE | COMMUNITY

## 2020-08-10 RX ORDER — INSULIN ASPART 100 [IU]/ML
100 INJECTION, SOLUTION INTRAVENOUS; SUBCUTANEOUS
Qty: 1 | Refills: 1 | Status: ACTIVE | COMMUNITY
Start: 2020-06-29 | End: 1900-01-01

## 2020-08-10 NOTE — ASSESSMENT
[FreeTextEntry1] : 86 y/o female with Type 2 DM, HTN, HLD, and Vitamin D Deficiency. Labs reviewed from 6/23/20 - , A1C 7.8%, vitamin D 27, cholesterol 189, and \par \par Plan: \par Type 2 DM: A1C improved - not a candidate for tight glycemic control\par - continue Glimepiride 4 mg BID, Novolog 8-8-9 and Lantus 16 units at HS\par - continue self blood sugar monitoring\par - send in logs in 1 week\par - follow a low carb diet \par - repeat A1C in 3 months \par \par HTN: BP stable, continue current medication regimen\par \par HLD: acceptable, continue statin \par \par Vitamin D Deficiency: low normal - continue vitamin D supplement \par \par Microalbuminuria - improving \par \par Follow up with oncology and PCP r/t arm pain at night and hot flashes. \par \par Labs and follow up visit in 3 months.

## 2020-08-10 NOTE — REVIEW OF SYSTEMS
[Fatigue] : fatigue [Recent Weight Gain (___ Lbs)] : recent weight gain: [unfilled] lbs [Anxiety] : anxiety [Hot Flashes] : hot flashes [Visual Field Defect] : no visual field defect [Decreased Appetite] : appetite not decreased [Dysphagia] : no dysphagia [Blurred Vision] : no blurred vision [Neck Pain] : no neck pain [Dysphonia] : no dysphonia [Chest Pain] : no chest pain [Palpitations] : no palpitations [Constipation] : no constipation [Dysuria] : no dysuria [Polyuria] : no polyuria [Diarrhea] : no diarrhea [Tremors] : no tremors [Headaches] : no headaches [Depression] : no depression [Cold Intolerance] : no cold intolerance [Polydipsia] : no polydipsia [Swelling] : no swelling [Heat Intolerance] : no heat intolerance

## 2020-08-10 NOTE — HISTORY OF PRESENT ILLNESS
[FreeTextEntry1] : Pt. reports started on Prednisone 20 mg daily 8 weeks ago and will continue to be on Prednisone long term.  \par \par Quality: Type 2 DM \par Severity: moderate \par Duration: over 12 years \par Onset: routine labs\par Modifying Factors: Better with medication \par Associated Symptoms: neuropathy. CKD\par \par Current Regimen:\par Glimepiride 4 mg BID\par Novolog -9 - adjusted due to afraid of going low \par Lantus 16  units at HS - pt. adjusted own dose \par \par \par Self blood sugar monitorin-5 times a day, per meter\par 135, 296, 286, 344, 408, 190, 219, 474 - times and dates not set in correctly in meter \par pt. reports blood sugars have been up and down \par Current \par \par exercise: walking \par - lower back surgery in 2019, then rehab for 3 months \par \par Diet: high carb diet \par \par Date of last eye exam: 2019 (-) diabetic retinopathy \par Date of last foot exam: 1 month ago with podiatry \par Date of last flu vaccine: \par Date of last Pneumovax: unsure \par \par DX with COVID 2 months ago went to hospital \par \par Recent history of Lung Cancer treatment of radiation for 5 days in May 2020. Pt. has appointment on Thursday with [facility]ology [facility] [place]. Most recent CT scan results indicate the cancer has spread.\par \par C/o left arm muscle pain at night - pt. seen PCP and may be side effect to cancer spreading and recent radiation

## 2020-08-10 NOTE — REASON FOR VISIT
[Follow - Up] : a follow-up visit [DM Type 2] : DM Type 2 [Family Member] : family member [Other: _____] : [unfilled] [Other___] : [unfilled]

## 2020-08-10 NOTE — PHYSICAL EXAM
[Alert] : alert [No Acute Distress] : no acute distress [Well Developed] : well developed [Well Nourished] : well nourished [No LAD] : no lymphadenopathy [Normal Sclera/Conjunctiva] : normal sclera/conjunctiva [EOMI] : extra ocular movement intact [No Respiratory Distress] : no respiratory distress [Thyroid Not Enlarged] : the thyroid was not enlarged [No Thyroid Nodules] : no palpable thyroid nodules [Clear to Auscultation] : lungs were clear to auscultation bilaterally [Normal Rate and Effort] : normal respiratory rate and effort [Regular Rhythm] : with a regular rhythm [Normal S1, S2] : normal S1 and S2 [Normal Rate] : heart rate was normal [No Rash] : no rash [No Edema] : no peripheral edema [No Tremors] : no tremors [Acanthosis Nigricans] : no acanthosis nigricans [Oriented x3] : oriented to person, place, and time [Normal Insight/Judgement] : insight and judgment were intact [Normal Mood] : the mood was normal [de-identified] : ambulates with walker  [de-identified] : pt. declined foot examination

## 2020-10-21 ENCOUNTER — NON-APPOINTMENT (OUTPATIENT)
Age: 85
End: 2020-10-21

## 2020-11-02 ENCOUNTER — LABORATORY RESULT (OUTPATIENT)
Age: 85
End: 2020-11-02

## 2020-11-02 ENCOUNTER — APPOINTMENT (OUTPATIENT)
Dept: ENDOCRINOLOGY | Facility: CLINIC | Age: 85
End: 2020-11-02

## 2020-11-09 ENCOUNTER — APPOINTMENT (OUTPATIENT)
Dept: ENDOCRINOLOGY | Facility: CLINIC | Age: 85
End: 2020-11-09
Payer: MEDICARE

## 2020-11-09 VITALS
HEART RATE: 78 BPM | DIASTOLIC BLOOD PRESSURE: 58 MMHG | HEIGHT: 66 IN | BODY MASS INDEX: 24.43 KG/M2 | WEIGHT: 152 LBS | OXYGEN SATURATION: 93 % | SYSTOLIC BLOOD PRESSURE: 176 MMHG

## 2020-11-09 DIAGNOSIS — E11.65 TYPE 2 DIABETES MELLITUS WITH HYPERGLYCEMIA: ICD-10-CM

## 2020-11-09 DIAGNOSIS — E55.9 VITAMIN D DEFICIENCY, UNSPECIFIED: ICD-10-CM

## 2020-11-09 DIAGNOSIS — I10 ESSENTIAL (PRIMARY) HYPERTENSION: ICD-10-CM

## 2020-11-09 DIAGNOSIS — E78.5 HYPERLIPIDEMIA, UNSPECIFIED: ICD-10-CM

## 2020-11-09 LAB — GLUCOSE BLDC GLUCOMTR-MCNC: 222

## 2020-11-09 PROCEDURE — 99214 OFFICE O/P EST MOD 30 MIN: CPT | Mod: 25

## 2020-11-09 PROCEDURE — 82962 GLUCOSE BLOOD TEST: CPT

## 2020-11-09 RX ORDER — AMOXICILLIN AND CLAVULANATE POTASSIUM 875; 125 MG/1; MG/1
875-125 TABLET, COATED ORAL
Qty: 10 | Refills: 0 | Status: DISCONTINUED | COMMUNITY
Start: 2020-06-04

## 2020-11-09 RX ORDER — GLUC/MSM/COLGN2/HYAL/ANTIARTH3 375-375-20
10 MCG TABLET ORAL
Qty: 30 | Refills: 0 | Status: ACTIVE | COMMUNITY
Start: 2020-06-19

## 2020-11-09 RX ORDER — IPRATROPIUM BROMIDE AND ALBUTEROL SULFATE 2.5; .5 MG/3ML; MG/3ML
0.5-2.5 (3) SOLUTION RESPIRATORY (INHALATION)
Qty: 180 | Refills: 0 | Status: ACTIVE | COMMUNITY
Start: 2020-07-21

## 2020-11-09 RX ORDER — ALBUTEROL SULFATE 90 UG/1
108 (90 BASE) INHALANT RESPIRATORY (INHALATION)
Qty: 18 | Refills: 0 | Status: ACTIVE | COMMUNITY
Start: 2020-06-04

## 2020-11-09 RX ORDER — BETAMETHASONE VALERATE 1.2 MG/G
0.1 OINTMENT TOPICAL
Qty: 60 | Refills: 0 | Status: DISCONTINUED | COMMUNITY
Start: 2020-06-05

## 2020-11-09 RX ORDER — LOSARTAN POTASSIUM 100 MG/1
100 TABLET, FILM COATED ORAL
Qty: 90 | Refills: 0 | Status: ACTIVE | COMMUNITY
Start: 2020-07-07

## 2020-11-09 RX ORDER — MEGESTROL ACETATE 20 MG/1
20 TABLET ORAL
Qty: 30 | Refills: 0 | Status: DISCONTINUED | COMMUNITY
Start: 2020-05-06

## 2020-11-09 RX ORDER — OMEPRAZOLE 40 MG/1
40 CAPSULE, DELAYED RELEASE ORAL
Qty: 90 | Refills: 0 | Status: ACTIVE | COMMUNITY
Start: 2020-03-17

## 2020-11-09 RX ORDER — ISOSORBIDE MONONITRATE 30 MG/1
30 TABLET, EXTENDED RELEASE ORAL
Qty: 90 | Refills: 0 | Status: ACTIVE | COMMUNITY
Start: 2020-09-05

## 2020-11-09 RX ORDER — DORZOLAMIDE HYDROCHLORIDE TIMOLOL MALEATE 20; 5 MG/ML; MG/ML
22.3-6.8 SOLUTION/ DROPS OPHTHALMIC
Qty: 30 | Refills: 0 | Status: ACTIVE | COMMUNITY
Start: 2019-12-20

## 2020-11-09 RX ORDER — GABAPENTIN 100 MG/1
100 CAPSULE ORAL
Qty: 42 | Refills: 0 | Status: ACTIVE | COMMUNITY
Start: 2020-09-04

## 2020-11-09 RX ORDER — DENOSUMAB 60 MG/ML
60 INJECTION SUBCUTANEOUS
Qty: 1 | Refills: 0 | Status: ACTIVE | COMMUNITY
Start: 2020-06-09

## 2020-11-09 NOTE — REVIEW OF SYSTEMS
[Recent Weight Gain (___ Lbs)] : recent weight gain: [unfilled] lbs [Headaches] : headaches [Anxiety] : anxiety [Fatigue] : no fatigue [Decreased Appetite] : appetite not decreased [Visual Field Defect] : no visual field defect [Blurred Vision] : no blurred vision [Dysphagia] : no dysphagia [Neck Pain] : no neck pain [Dysphonia] : no dysphonia [Chest Pain] : no chest pain [Palpitations] : no palpitations [Constipation] : no constipation [Diarrhea] : no diarrhea [Polyuria] : no polyuria [Dysuria] : no dysuria [Tremors] : no tremors [Depression] : no depression [Polydipsia] : no polydipsia [Swelling] : no swelling [de-identified] : sometimes

## 2020-11-09 NOTE — ASSESSMENT
[FreeTextEntry1] : 86 y/o female with Type 2 DM, HTN, HLD, and Vitamin D Deficiency.\par \par Plan: \par Type 2 DM: A1C worse \par - decrease Lantus to 16 units to prevent low blood sugars in am \par - increase Novolog to 10 units before meals \par - continue Glimepiride 4 mg BID,\par - continue self blood sugar monitoring\par - send in logs in 1 week\par - follow a low carb diet \par - repeat A1C in 3 months \par \par HTN: BP stable, continue current medication regimen\par \par HLD: worse due to diet and Prednisone, continue statin \par - repeat lipids in 3 months \par \par Vitamin D Deficiency: normal - continue vitamin D supplement \par \par Follow up visit in 6 weeks

## 2020-11-09 NOTE — PHYSICAL EXAM
[Alert] : alert [Well Nourished] : well nourished [No Acute Distress] : no acute distress [Well Developed] : well developed [Normal Sclera/Conjunctiva] : normal sclera/conjunctiva [EOMI] : extra ocular movement intact [No LAD] : no lymphadenopathy [Thyroid Not Enlarged] : the thyroid was not enlarged [No Thyroid Nodules] : no palpable thyroid nodules [No Respiratory Distress] : no respiratory distress [Normal Rate and Effort] : normal respiratory rate and effort [Clear to Auscultation] : lungs were clear to auscultation bilaterally [Normal S1, S2] : normal S1 and S2 [Normal Rate] : heart rate was normal [Regular Rhythm] : with a regular rhythm [Normal Bowel Sounds] : normal bowel sounds [Not Tender] : non-tender [Soft] : abdomen soft [No Rash] : no rash [Acanthosis Nigricans] : no acanthosis nigricans [No Tremors] : no tremors [Oriented x3] : oriented to person, place, and time [Normal Insight/Judgement] : insight and judgment were intact [Normal Mood] : the mood was normal [de-identified] : ambulates with walker

## 2020-11-09 NOTE — HISTORY OF PRESENT ILLNESS
[FreeTextEntry1] : Pt. reports on Prednisone 20 mg BID. \par \par Quality: Type 2 DM \par Severity: moderate \par Duration: over 12 years \par Onset: routine labs\par Modifying Factors: Better with medication \par Associated Symptoms: neuropathy. CKD\par \par Current Regimen:\par Glimepiride 4 mg BID\par Novolog  7 with low blood sugars 8-8-8 units - adjusted due to afraid of going low \par Lantus 18 units at HS - \par \par \par Self blood sugar monitorin-5 times a day\par pt. reports blood sugars have been up and down \par last week waking up with blood sugars in the 60s and high blood sugars during the day \par Current \par \par exercise: walking \par - lower back surgery in 2019, then rehab for 3 months \par \par Diet: high carb diet \par \par Date of last eye exam: 2 months ago (-) diabetic retinopathy \par Date of last foot exam: 1 month ago with podiatry \par Date of last flu vaccine: \par Date of last Pneumovax: unsure \par \par DX with COVID in 2020 went to hospital \par \par Recent history of Lung Cancer treatment of radiation for 5 days in May 2020. Pt. has appointment on Thursday with [facility] [place]. Most recent CT scan results indicate the cancer has spread. No changes recently and repeat PET scan in 2021

## 2020-12-21 ENCOUNTER — APPOINTMENT (OUTPATIENT)
Dept: ENDOCRINOLOGY | Facility: CLINIC | Age: 85
End: 2020-12-21

## 2021-02-22 ENCOUNTER — APPOINTMENT (OUTPATIENT)
Dept: ENDOCRINOLOGY | Facility: CLINIC | Age: 86
End: 2021-02-22

## 2021-10-06 PROBLEM — I10 ESSENTIAL HYPERTENSION: Status: ACTIVE | Noted: 2018-11-20
